# Patient Record
Sex: FEMALE | Race: OTHER | NOT HISPANIC OR LATINO | URBAN - METROPOLITAN AREA
[De-identification: names, ages, dates, MRNs, and addresses within clinical notes are randomized per-mention and may not be internally consistent; named-entity substitution may affect disease eponyms.]

---

## 2019-03-02 ENCOUNTER — EMERGENCY (EMERGENCY)
Facility: HOSPITAL | Age: 44
LOS: 1 days | Discharge: HOME | End: 2019-03-02
Attending: EMERGENCY MEDICINE
Payer: SELF-PAY

## 2019-03-02 VITALS
OXYGEN SATURATION: 99 % | HEIGHT: 62 IN | HEART RATE: 105 BPM | WEIGHT: 160.06 LBS | TEMPERATURE: 99 F | DIASTOLIC BLOOD PRESSURE: 91 MMHG | RESPIRATION RATE: 20 BRPM | SYSTOLIC BLOOD PRESSURE: 158 MMHG

## 2019-03-02 VITALS — HEART RATE: 93 BPM | DIASTOLIC BLOOD PRESSURE: 96 MMHG | SYSTOLIC BLOOD PRESSURE: 160 MMHG

## 2019-03-02 PROCEDURE — 99283 EMERGENCY DEPT VISIT LOW MDM: CPT

## 2019-03-02 RX ORDER — OXYCODONE AND ACETAMINOPHEN 5; 325 MG/1; MG/1
1 TABLET ORAL ONCE
Qty: 0 | Refills: 0 | Status: DISCONTINUED | OUTPATIENT
Start: 2019-03-02 | End: 2019-03-02

## 2019-03-02 RX ADMIN — Medication 1 TABLET(S): at 20:32

## 2019-03-02 RX ADMIN — OXYCODONE AND ACETAMINOPHEN 1 TABLET(S): 5; 325 TABLET ORAL at 21:08

## 2019-03-02 NOTE — ED PROVIDER NOTE - ATTENDING CONTRIBUTION TO CARE
The patient seen and examined.  The patient presents with earache     Otitis Media    I, Natan Leblanc, performed the initial face to face bedside interview with this patient regarding history of present illness, review of symptoms and relevant past medical, social and family history.  I completed an independent physical examination.  I was the initial provider who evaluated this patient. I have signed out the follow up of any pending tests (i.e. labs, radiological studies) to the ACP.  I have communicated the patient’s plan of care and disposition with the ACP.

## 2019-03-02 NOTE — ED PROVIDER NOTE - PHYSICAL EXAMINATION
Const: Awake, alert and oriented. In no acute distress. Well appearing.  HEENT: NC/AT. Ears: Left TM erythematous, bulging, exudate noted TM Right TM slightly erythematous Throat: Pharynx clear, uvula is midline, tongue symmetrical   Eyes: Conjunctiva pink, sclera white bilaterally  Neck:. Soft and supple. Full ROM without pain.  Cardiac:  +S1/S2. No murmurs. Peripheral pulses 2+ and symmetric. No LE edema.  Resp: Speaking in full sentences. No evidence of respiratory distress. No wheezes, rales or rhonchi.  Abd: Soft, non-tender, non-distended. Normal bowel sounds in all 4 quadrants. No guarding or rebound.  Back: Spine midline and non-tender. No CVAT.  Skin: No rashes, abrasions or lacerations.  Lymph: No cervical lymphadenopathy.  Neuro: Awake, alert & oriented x 3. CN II-XII intact, finger to nose intact, neurovasculary intact, muscle strength fair, gait without ataxia, reflexes intact

## 2019-03-02 NOTE — ED PROVIDER NOTE - CLINICAL SUMMARY MEDICAL DECISION MAKING FREE TEXT BOX
Patient is a 42 y/o female presenting with left ear pain since friday, on exam left TM erythematous, bulging, exudate noted will treat with antibiotics, pt denies hx of HTN, pt stating is not experiencing cp, SOB, visual changes, headache, pt BP is slightly high in ED, pt is informed to f/u with pmd for BP, will give [facility], pt to return to the ED if she experiences cp, SOB, visual changes, headache, pt explained d/c instructions

## 2019-03-02 NOTE — ED PROVIDER NOTE - OBJECTIVE STATEMENT
Patient is a 44 y/o female presenting with left ear pain since friday. Patient states left ear feels as it is "clogged". Patient reports two days prior to ear hurting she was experiencing nasal congestion and cold. Patient denies fever. Patient admits to taking ibuprofen for the pain in the ear. Patient denies visual changes, neck pain, sore throat, dysphagia, cp, SOB, abdominal pain, nausea, vomiting, diarrhea, dysuria.

## 2019-03-03 RX ORDER — IBUPROFEN 200 MG
1 TABLET ORAL
Qty: 15 | Refills: 0 | OUTPATIENT
Start: 2019-03-03 | End: 2019-03-07

## 2021-06-08 NOTE — ED ADULT TRIAGE NOTE - AS HEIGHT TYPE
Dr. Martinez-  See request below.  She only has albuterol neb solution on med list, not the inhaler, unable to que up.  Please advise.   stated